# Patient Record
Sex: FEMALE | Race: WHITE | NOT HISPANIC OR LATINO | URBAN - METROPOLITAN AREA
[De-identification: names, ages, dates, MRNs, and addresses within clinical notes are randomized per-mention and may not be internally consistent; named-entity substitution may affect disease eponyms.]

---

## 2017-03-06 ENCOUNTER — ALLSCRIPTS OFFICE VISIT (OUTPATIENT)
Dept: OTHER | Facility: OTHER | Age: 22
End: 2017-03-06

## 2017-03-07 ENCOUNTER — GENERIC CONVERSION - ENCOUNTER (OUTPATIENT)
Dept: OTHER | Facility: OTHER | Age: 22
End: 2017-03-07

## 2017-03-09 LAB
INTERPRETATION (HISTORICAL): NORMAL
QAUNTIFERON NIL VALUE (HISTORICAL): 0.05 IU/ML
QFT TB AG MINUS NIL VALUE (HISTORICAL): 0.01 IU/ML
QUANTIFERON CRITERIA (HISTORICAL): NORMAL
QUANTIFERON INCUB COMMENT (HISTORICAL): NORMAL
QUANTIFERON MITOGEN VALUE (HISTORICAL): >10 IU/ML
QUANTIFERON TB AG VALUE (HISTORICAL): 0.06 IU/ML
QUANTIFERON TB GOLD (HISTORICAL): NEGATIVE

## 2018-01-15 NOTE — PROGRESS NOTES
Assessment    1  School health examination (V70 5) (Z02 0)    Plan  School health examination    · (1) QU[employer] - TB GOLD; Status:Active; Requested for:2017;     Discussion/Summary  health maintenance visit Currently, she eats a healthy diet and has an adequate exercise regimen  cervical cancer screening is managed by GYN Breast cancer screening: breast cancer screening is managed by GYN  Colorectal cancer screening: colorectal cancer screening is not indicated  Osteoporosis screening: bone mineral density testing is not indicated  Advice and education were given regarding nutrition  Patient sent to get blood test for TB, slip given for Quant  test  RTO as needed  Self Referrals: Yes      Chief Complaint  Pt here for HM today  dk    Saw prev PCP & her GYN since here  She is s NP to us  History of Present Illness  HPI: 24year old white female presents for PE  Last PE many years ago  Saw GYN about one month ago, patient currently breast feeding  Last pap about one year ago  Takes pre- vitamins, baby is 1 months old  Last blood test about 6 months ago  Review of Systems    Constitutional: no fever, no chills and not feeling tired  Eyes: no eyesight problems  ENT: no earache, no sore throat and no nasal discharge  Cardiovascular: no chest pain and no palpitations  Respiratory: no shortness of breath and no cough  Gastrointestinal: no abdominal pain, no nausea and no vomiting  Neurological: no headache, no numbness, no tingling, no dizziness and no fainting  Psychiatric: not suicidal, no anxiety, no sleep disturbances and no depression  Over the past 2 weeks, how often have you been bothered by the following problems? 1 ) Little interest or pleasure in doing things? Not at all    2 ) Feeling down, depressed or hopeless? Not at all    3 ) Trouble falling asleep or sleeping too much? Not at all    4 ) Feeling tired or having little energy?  Not at all    5 ) Poor appetite or overeating? Not at all    6 ) Feeling bad about yourself, or that you are a failure, or have let yourself or your family down? Not at all    7 ) Trouble concentrating on things, such as reading a newspaper or watching television? Not at all    8 ) Moving or speaking so slowly that other people could have noticed, or the opposite, moving or speaking faster than usual? Not at all    9 ) Thoughts that you would be better off dead or of hurting yourself in some way? Not at all  Score 0      Family History  Grandfather    · Family history of diabetes mellitus (V18 0) (Z83 3)    Social History    · Daily caffeine consumption   · Never smoker   · No illicit drug use   · Social drinker (V49 89) (Z78 9)    Current Meds   1  Prenatal Vitamins TABS; Therapy: (Recorded:06Mar2017) to Recorded    Allergies    1  No Known Drug Allergies    2  Pollen   3  Trees    Vitals   Recorded: [id number] 11:11AM   Heart Rate 72   Respiration 16   Systolic 153   Diastolic 70   Height 5 ft 2 in   Weight 140 lb    BMI Calculated 25 61   BSA Calculated 1 64     Physical Exam    Constitutional   General appearance: No acute distress, well appearing and well nourished  Head and Face   Head and face: Normal     Palpation of the face and sinuses: No sinus tenderness  Eyes   Conjunctiva and lids: No swelling, erythema or discharge  Pupils and irises: Equal, round, reactive to light  Ears, Nose, Mouth, and Throat   External inspection of ears and nose: Normal     Otoscopic examination: Tympanic membranes translucent with normal light reflex  Canals patent without erythema  Hearing: Normal     Nasal mucosa, septum, and turbinates: Normal without edema or erythema  Lips, teeth, and gums: Normal, good dentition  Oropharynx: Normal with no erythema, edema, exudate or lesions  Neck   Neck: Supple, symmetric, trachea midline, no masses      Pulmonary   Respiratory effort: No increased work of breathing or signs of respiratory distress  Auscultation of lungs: Clear to auscultation  Cardiovascular   Auscultation of heart: Normal rate and rhythm, normal S1 and S2, no murmurs  Pedal pulses: 2+ bilaterally  Examination of extremities for edema and/or varicosities: Normal     Abdomen   Abdomen: Non-tender, no masses  Liver and spleen: No hepatomegaly or splenomegaly  Musculoskeletal   Gait and station: Normal     Digits and nails: Normal without clubbing or cyanosis  Joints, bones, and muscles: Normal     Range of motion: Normal     Stability: Normal     Muscle strength/tone: Normal     Neurologic   Cranial nerves: Cranial nerves II-XII intact  Cortical function: Normal mental status  Reflexes: 2+ and symmetric  Sensation: No sensory loss  Coordination: Normal finger to nose and heel to shin  Psychiatric   Judgment and insight: Normal     Orientation to person, place, and time: Normal     Recent and remote memory: Intact      Mood and affect: Normal        Signatures   Electronically signed by : LUIS ANGEL Avila; Mar  6 2017 11:50AM EST                       (Author)    Electronically signed by : LUIS MIGUEL Paulino ; Mar  6 2017  2:17PM EST

## 2018-01-22 VITALS
HEIGHT: 62 IN | HEART RATE: 72 BPM | WEIGHT: 140 LBS | BODY MASS INDEX: 25.76 KG/M2 | DIASTOLIC BLOOD PRESSURE: 70 MMHG | SYSTOLIC BLOOD PRESSURE: 122 MMHG | RESPIRATION RATE: 16 BRPM

## 2018-11-03 ENCOUNTER — OFFICE VISIT (OUTPATIENT)
Dept: URGENT CARE | Facility: CLINIC | Age: 23
End: 2018-11-03
Payer: COMMERCIAL

## 2018-11-03 VITALS
WEIGHT: 138 LBS | HEIGHT: 62 IN | DIASTOLIC BLOOD PRESSURE: 68 MMHG | BODY MASS INDEX: 25.4 KG/M2 | TEMPERATURE: 98 F | OXYGEN SATURATION: 98 % | HEART RATE: 98 BPM | SYSTOLIC BLOOD PRESSURE: 118 MMHG | RESPIRATION RATE: 16 BRPM

## 2018-11-03 DIAGNOSIS — H69.82 DYSFUNCTION OF LEFT EUSTACHIAN TUBE: Primary | ICD-10-CM

## 2018-11-03 PROCEDURE — 99283 EMERGENCY DEPT VISIT LOW MDM: CPT | Performed by: PHYSICIAN ASSISTANT

## 2018-11-03 PROCEDURE — G0382 LEV 3 HOSP TYPE B ED VISIT: HCPCS | Performed by: PHYSICIAN ASSISTANT

## 2018-11-03 NOTE — PROGRESS NOTES
[facility]        NAME: Altaf Aguilar is a 20 y o  female  : 1995    MRN: [id number]  DATE: November 3, 2018  TIME: 4:36 PM    Assessment and Plan   Dysfunction of left eustachian tube [H69 82]  1  Dysfunction of left eustachian tube           Patient Instructions       Follow up with PCP in 3-5 days  Proceed to  ER if symptoms worsen  Chief Complaint     Chief Complaint   Patient presents with    Earache     Last night left jaw/ear pain  Feels feverish  Headache  Denies runny/stuffy nose, cough  History of Present Illness       Patient is here with her 2 children to be seen  Patient has had a left earache which started yesterday  Her 2 children are also sick  She denies any fevers, chills, body aches, dizziness, headache, sore throat, nasal congestion, runny nose  Review of Systems   Review of Systems      Current Medications     No current outpatient prescriptions on file  Current Allergies     Allergies as of 2018 - Reviewed 2018   Allergen Reaction Noted    Pollen extract  2017    Tree extract  2017            The following portions of the patient's history were reviewed and updated as appropriate: allergies, current medications, past family history, past medical history, past social history, past surgical history and problem list      No past medical history on file  No past surgical history on file  No family history on file  Medications have been verified  Objective   /68   Pulse 98   Temp 98 °F (36 7 °C)   Resp 16   Ht 5' 2" (1 575 m)   Wt 62 6 kg (138 lb)   SpO2 98%   BMI 25 24 kg/m²        Physical Exam     Physical Exam   Constitutional: She is oriented to person, place, and time  She appears well-developed and well-nourished  No distress  HENT:   Head: Normocephalic and atraumatic     Right Ear: External ear normal    Left Ear: External ear normal    Nose: Nose normal    Mouth/Throat: Oropharynx is clear and moist  No oropharyngeal exudate  TMs intact bilaterally with clear fluid in the left middle ear  Slight bulge to the TM with no erythema  No purulent fluid  Eyes: Pupils are equal, round, and reactive to light  Conjunctivae and EOM are normal    Lymphadenopathy:     She has no cervical adenopathy  Neurological: She is alert and oriented to person, place, and time  Skin: Skin is warm and dry  Psychiatric: She has a normal mood and affect  Her behavior is normal    Nursing note and vitals reviewed

## 2018-11-03 NOTE — PATIENT INSTRUCTIONS
Use Flonase daily as directed    May use Mucinex PE as directed      Follow-up with the primary care physician in 3-4 days if symptoms persist